# Patient Record
Sex: FEMALE | Race: ASIAN | NOT HISPANIC OR LATINO | URBAN - METROPOLITAN AREA
[De-identification: names, ages, dates, MRNs, and addresses within clinical notes are randomized per-mention and may not be internally consistent; named-entity substitution may affect disease eponyms.]

---

## 2017-06-06 ENCOUNTER — EMERGENCY (EMERGENCY)
Facility: HOSPITAL | Age: 32
LOS: 1 days | Discharge: HOME | End: 2017-06-06
Attending: EMERGENCY MEDICINE | Admitting: EMERGENCY MEDICINE
Payer: MEDICAID

## 2017-06-06 VITALS
HEART RATE: 74 BPM | RESPIRATION RATE: 18 BRPM | SYSTOLIC BLOOD PRESSURE: 141 MMHG | OXYGEN SATURATION: 99 % | TEMPERATURE: 98 F | DIASTOLIC BLOOD PRESSURE: 68 MMHG

## 2017-06-06 VITALS
OXYGEN SATURATION: 100 % | TEMPERATURE: 98 F | SYSTOLIC BLOOD PRESSURE: 125 MMHG | HEART RATE: 81 BPM | RESPIRATION RATE: 18 BRPM | DIASTOLIC BLOOD PRESSURE: 82 MMHG

## 2017-06-06 LAB
ALBUMIN SERPL ELPH-MCNC: 3.8 G/DL — SIGNIFICANT CHANGE UP (ref 3.3–5)
ALP SERPL-CCNC: 65 U/L — SIGNIFICANT CHANGE UP (ref 40–120)
ALT FLD-CCNC: 15 U/L — SIGNIFICANT CHANGE UP (ref 4–33)
APPEARANCE UR: CLEAR — SIGNIFICANT CHANGE UP
AST SERPL-CCNC: 22 U/L — SIGNIFICANT CHANGE UP (ref 4–32)
BACTERIA # UR AUTO: SIGNIFICANT CHANGE UP
BASOPHILS # BLD AUTO: 0.03 K/UL — SIGNIFICANT CHANGE UP (ref 0–0.2)
BASOPHILS NFR BLD AUTO: 0.3 % — SIGNIFICANT CHANGE UP (ref 0–2)
BILIRUB SERPL-MCNC: 0.2 MG/DL — SIGNIFICANT CHANGE UP (ref 0.2–1.2)
BILIRUB UR-MCNC: NEGATIVE — SIGNIFICANT CHANGE UP
BLOOD UR QL VISUAL: NEGATIVE — SIGNIFICANT CHANGE UP
BUN SERPL-MCNC: 8 MG/DL — SIGNIFICANT CHANGE UP (ref 7–23)
CALCIUM SERPL-MCNC: 8.8 MG/DL — SIGNIFICANT CHANGE UP (ref 8.4–10.5)
CHLORIDE SERPL-SCNC: 105 MMOL/L — SIGNIFICANT CHANGE UP (ref 98–107)
CO2 SERPL-SCNC: 16 MMOL/L — LOW (ref 22–31)
COLOR SPEC: SIGNIFICANT CHANGE UP
CREAT SERPL-MCNC: 0.59 MG/DL — SIGNIFICANT CHANGE UP (ref 0.5–1.3)
EOSINOPHIL # BLD AUTO: 0.26 K/UL — SIGNIFICANT CHANGE UP (ref 0–0.5)
EOSINOPHIL NFR BLD AUTO: 2.2 % — SIGNIFICANT CHANGE UP (ref 0–6)
GLUCOSE SERPL-MCNC: 81 MG/DL — SIGNIFICANT CHANGE UP (ref 70–99)
GLUCOSE UR-MCNC: NEGATIVE — SIGNIFICANT CHANGE UP
HCT VFR BLD CALC: 39.1 % — SIGNIFICANT CHANGE UP (ref 34.5–45)
HGB BLD-MCNC: 11.9 G/DL — SIGNIFICANT CHANGE UP (ref 11.5–15.5)
IMM GRANULOCYTES NFR BLD AUTO: 0.5 % — SIGNIFICANT CHANGE UP (ref 0–1.5)
KETONES UR-MCNC: NEGATIVE — SIGNIFICANT CHANGE UP
LEUKOCYTE ESTERASE UR-ACNC: HIGH
LYMPHOCYTES # BLD AUTO: 18.1 % — SIGNIFICANT CHANGE UP (ref 13–44)
LYMPHOCYTES # BLD AUTO: 2.13 K/UL — SIGNIFICANT CHANGE UP (ref 1–3.3)
MCHC RBC-ENTMCNC: 25.7 PG — LOW (ref 27–34)
MCHC RBC-ENTMCNC: 30.4 % — LOW (ref 32–36)
MCV RBC AUTO: 84.4 FL — SIGNIFICANT CHANGE UP (ref 80–100)
MONOCYTES # BLD AUTO: 0.61 K/UL — SIGNIFICANT CHANGE UP (ref 0–0.9)
MONOCYTES NFR BLD AUTO: 5.2 % — SIGNIFICANT CHANGE UP (ref 2–14)
NEUTROPHILS # BLD AUTO: 8.66 K/UL — HIGH (ref 1.8–7.4)
NEUTROPHILS NFR BLD AUTO: 73.7 % — SIGNIFICANT CHANGE UP (ref 43–77)
NITRITE UR-MCNC: NEGATIVE — SIGNIFICANT CHANGE UP
NON-SQ EPI CELLS # UR AUTO: <1 — SIGNIFICANT CHANGE UP
PH UR: 6.5 — SIGNIFICANT CHANGE UP (ref 4.6–8)
PLATELET # BLD AUTO: 124 K/UL — LOW (ref 150–400)
PMV BLD: SIGNIFICANT CHANGE UP FL (ref 7–13)
POTASSIUM SERPL-MCNC: 5.1 MMOL/L — SIGNIFICANT CHANGE UP (ref 3.5–5.3)
POTASSIUM SERPL-SCNC: 5.1 MMOL/L — SIGNIFICANT CHANGE UP (ref 3.5–5.3)
PROT SERPL-MCNC: 6.9 G/DL — SIGNIFICANT CHANGE UP (ref 6–8.3)
PROT UR-MCNC: NEGATIVE — SIGNIFICANT CHANGE UP
RBC # BLD: 4.63 M/UL — SIGNIFICANT CHANGE UP (ref 3.8–5.2)
RBC # FLD: 14 % — SIGNIFICANT CHANGE UP (ref 10.3–14.5)
RBC CASTS # UR COMP ASSIST: SIGNIFICANT CHANGE UP (ref 0–?)
SODIUM SERPL-SCNC: 139 MMOL/L — SIGNIFICANT CHANGE UP (ref 135–145)
SP GR SPEC: 1 — SIGNIFICANT CHANGE UP (ref 1–1.03)
SQUAMOUS # UR AUTO: SIGNIFICANT CHANGE UP
UROBILINOGEN FLD QL: NORMAL E.U. — SIGNIFICANT CHANGE UP (ref 0.1–0.2)
WBC # BLD: 11.75 K/UL — HIGH (ref 3.8–10.5)
WBC # FLD AUTO: 11.75 K/UL — HIGH (ref 3.8–10.5)
WBC UR QL: HIGH (ref 0–?)

## 2017-06-06 PROCEDURE — 99284 EMERGENCY DEPT VISIT MOD MDM: CPT

## 2017-06-06 RX ORDER — CEPHALEXIN 500 MG
1 CAPSULE ORAL
Qty: 28 | Refills: 0 | OUTPATIENT
Start: 2017-06-06 | End: 2017-06-13

## 2017-06-06 RX ORDER — IBUPROFEN 200 MG
1 TABLET ORAL
Qty: 30 | Refills: 0
Start: 2017-06-06

## 2017-06-06 RX ORDER — ONDANSETRON 8 MG/1
1 TABLET, FILM COATED ORAL
Qty: 15 | Refills: 0 | OUTPATIENT
Start: 2017-06-06

## 2017-06-06 RX ORDER — KETOROLAC TROMETHAMINE 30 MG/ML
30 SYRINGE (ML) INJECTION ONCE
Qty: 0 | Refills: 0 | Status: DISCONTINUED | OUTPATIENT
Start: 2017-06-06 | End: 2017-06-06

## 2017-06-06 RX ADMIN — Medication 30 MILLIGRAM(S): at 14:09

## 2017-06-06 NOTE — ED PROVIDER NOTE - MEDICAL DECISION MAKING DETAILS
32f, with renal colic. given same as previous and did not need procedure, no need for imaging today. will do labs, ua, toradol, r/a.

## 2017-06-06 NOTE — ED PROVIDER NOTE - PROGRESS NOTE DETAILS
symptoms improved. labs show wbc 5-10 in ua with LE, will give keflex. bloods otherwise unremarkable. will give zofran, ibuprofen and keflex for d/c. rter for persistent pain and vomiting or any s/s UTI. f/u urology.

## 2017-06-06 NOTE — ED ADULT NURSE NOTE - OBJECTIVE STATEMENT
pt AO x3, ambulatory, c/o left lower back pain radiating to left groin area since this morning. Also c/o n/v. Denies chest pain, dizziness and SOB by now. Evaluated by provider. Blood obtained and sent. IV inserted. Right hand 22G. Due medication given as ordered. Will continue to monitor.

## 2017-06-06 NOTE — ED PROVIDER NOTE - OBJECTIVE STATEMENT
32f, pmhx kidney stones 6 months ago, passed spontaneously. this am, began to have left flank pain raditating to llq, a/w n/v, same as previous kidney stone. no f/c or dysuria, no change in bm. did not try meds prior to coming to ED. 32f, pmhx kidney stones 6 months ago, passed spontaneously. this am, began to have left flank pain radiating to llq, a/w n/v, same as previous kidney stone. no f/c or dysuria, no change in bm. did not try meds prior to coming to ED.

## 2019-03-01 ENCOUNTER — EMERGENCY (EMERGENCY)
Facility: HOSPITAL | Age: 34
LOS: 1 days | Discharge: HOME | End: 2019-03-01
Attending: EMERGENCY MEDICINE | Admitting: EMERGENCY MEDICINE
Payer: COMMERCIAL

## 2019-03-01 VITALS
SYSTOLIC BLOOD PRESSURE: 128 MMHG | HEART RATE: 79 BPM | TEMPERATURE: 98 F | RESPIRATION RATE: 16 BRPM | DIASTOLIC BLOOD PRESSURE: 89 MMHG | OXYGEN SATURATION: 100 %

## 2019-03-01 LAB
APPEARANCE UR: CLEAR — SIGNIFICANT CHANGE UP
BACTERIA # UR AUTO: SIGNIFICANT CHANGE UP
BASE EXCESS BLDV CALC-SCNC: 2.7 MMOL/L — SIGNIFICANT CHANGE UP
BASOPHILS # BLD AUTO: 0.04 K/UL — SIGNIFICANT CHANGE UP (ref 0–0.2)
BASOPHILS NFR BLD AUTO: 0.3 % — SIGNIFICANT CHANGE UP (ref 0–2)
BILIRUB UR-MCNC: NEGATIVE — SIGNIFICANT CHANGE UP
BLOOD GAS VENOUS - CREATININE: 0.65 MG/DL — SIGNIFICANT CHANGE UP (ref 0.5–1.3)
BLOOD UR QL VISUAL: SIGNIFICANT CHANGE UP
CHLORIDE BLDV-SCNC: 105 MMOL/L — SIGNIFICANT CHANGE UP (ref 96–108)
COLOR SPEC: SIGNIFICANT CHANGE UP
EOSINOPHIL # BLD AUTO: 0.15 K/UL — SIGNIFICANT CHANGE UP (ref 0–0.5)
EOSINOPHIL NFR BLD AUTO: 1.3 % — SIGNIFICANT CHANGE UP (ref 0–6)
GAS PNL BLDV: 135 MMOL/L — LOW (ref 136–146)
GLUCOSE BLDV-MCNC: 110 — HIGH (ref 70–99)
GLUCOSE UR-MCNC: NEGATIVE — SIGNIFICANT CHANGE UP
HCO3 BLDV-SCNC: 25 MMOL/L — SIGNIFICANT CHANGE UP (ref 20–27)
HCT VFR BLD CALC: 40.6 % — SIGNIFICANT CHANGE UP (ref 34.5–45)
HCT VFR BLDV CALC: 39.1 % — SIGNIFICANT CHANGE UP (ref 34.5–45)
HGB BLD-MCNC: 12.5 G/DL — SIGNIFICANT CHANGE UP (ref 11.5–15.5)
HGB BLDV-MCNC: 12.7 G/DL — SIGNIFICANT CHANGE UP (ref 11.5–15.5)
HYALINE CASTS # UR AUTO: NEGATIVE — SIGNIFICANT CHANGE UP
IMM GRANULOCYTES NFR BLD AUTO: 0.4 % — SIGNIFICANT CHANGE UP (ref 0–1.5)
KETONES UR-MCNC: NEGATIVE — SIGNIFICANT CHANGE UP
LACTATE BLDV-MCNC: 1.5 MMOL/L — SIGNIFICANT CHANGE UP (ref 0.5–2)
LEUKOCYTE ESTERASE UR-ACNC: SIGNIFICANT CHANGE UP
LYMPHOCYTES # BLD AUTO: 18.1 % — SIGNIFICANT CHANGE UP (ref 13–44)
LYMPHOCYTES # BLD AUTO: 2.1 K/UL — SIGNIFICANT CHANGE UP (ref 1–3.3)
MCHC RBC-ENTMCNC: 25.5 PG — LOW (ref 27–34)
MCHC RBC-ENTMCNC: 30.8 % — LOW (ref 32–36)
MCV RBC AUTO: 82.9 FL — SIGNIFICANT CHANGE UP (ref 80–100)
MONOCYTES # BLD AUTO: 0.86 K/UL — SIGNIFICANT CHANGE UP (ref 0–0.9)
MONOCYTES NFR BLD AUTO: 7.4 % — SIGNIFICANT CHANGE UP (ref 2–14)
NEUTROPHILS # BLD AUTO: 8.38 K/UL — HIGH (ref 1.8–7.4)
NEUTROPHILS NFR BLD AUTO: 72.5 % — SIGNIFICANT CHANGE UP (ref 43–77)
NITRITE UR-MCNC: NEGATIVE — SIGNIFICANT CHANGE UP
NRBC # FLD: 0 K/UL — LOW (ref 25–125)
PCO2 BLDV: 48 MMHG — SIGNIFICANT CHANGE UP (ref 41–51)
PH BLDV: 7.38 PH — SIGNIFICANT CHANGE UP (ref 7.32–7.43)
PH UR: 6.5 — SIGNIFICANT CHANGE UP (ref 5–8)
PLATELET # BLD AUTO: 167 K/UL — SIGNIFICANT CHANGE UP (ref 150–400)
PMV BLD: SIGNIFICANT CHANGE UP FL (ref 7–13)
PO2 BLDV: 29 MMHG — LOW (ref 35–40)
POTASSIUM BLDV-SCNC: 3.3 MMOL/L — LOW (ref 3.4–4.5)
PROT UR-MCNC: NEGATIVE — SIGNIFICANT CHANGE UP
RBC # BLD: 4.9 M/UL — SIGNIFICANT CHANGE UP (ref 3.8–5.2)
RBC # FLD: 13.3 % — SIGNIFICANT CHANGE UP (ref 10.3–14.5)
RBC CASTS # UR COMP ASSIST: SIGNIFICANT CHANGE UP (ref 0–?)
SAO2 % BLDV: 46.5 % — LOW (ref 60–85)
SP GR SPEC: 1.01 — SIGNIFICANT CHANGE UP (ref 1–1.04)
SQUAMOUS # UR AUTO: SIGNIFICANT CHANGE UP
UROBILINOGEN FLD QL: NORMAL — SIGNIFICANT CHANGE UP
WBC # BLD: 11.58 K/UL — HIGH (ref 3.8–10.5)
WBC # FLD AUTO: 11.58 K/UL — HIGH (ref 3.8–10.5)
WBC UR QL: HIGH (ref 0–?)

## 2019-03-01 PROCEDURE — 99285 EMERGENCY DEPT VISIT HI MDM: CPT

## 2019-03-01 RX ORDER — ONDANSETRON 8 MG/1
4 TABLET, FILM COATED ORAL ONCE
Qty: 0 | Refills: 0 | Status: COMPLETED | OUTPATIENT
Start: 2019-03-01 | End: 2019-03-01

## 2019-03-01 RX ORDER — SODIUM CHLORIDE 9 MG/ML
1000 INJECTION INTRAMUSCULAR; INTRAVENOUS; SUBCUTANEOUS ONCE
Qty: 0 | Refills: 0 | Status: COMPLETED | OUTPATIENT
Start: 2019-03-01 | End: 2019-03-01

## 2019-03-01 RX ORDER — KETOROLAC TROMETHAMINE 30 MG/ML
15 SYRINGE (ML) INJECTION ONCE
Qty: 0 | Refills: 0 | Status: DISCONTINUED | OUTPATIENT
Start: 2019-03-01 | End: 2019-03-01

## 2019-03-01 RX ORDER — MORPHINE SULFATE 50 MG/1
2 CAPSULE, EXTENDED RELEASE ORAL ONCE
Qty: 0 | Refills: 0 | Status: DISCONTINUED | OUTPATIENT
Start: 2019-03-01 | End: 2019-03-01

## 2019-03-01 RX ADMIN — ONDANSETRON 4 MILLIGRAM(S): 8 TABLET, FILM COATED ORAL at 23:53

## 2019-03-01 RX ADMIN — SODIUM CHLORIDE 2000 MILLILITER(S): 9 INJECTION INTRAMUSCULAR; INTRAVENOUS; SUBCUTANEOUS at 23:32

## 2019-03-01 RX ADMIN — MORPHINE SULFATE 2 MILLIGRAM(S): 50 CAPSULE, EXTENDED RELEASE ORAL at 23:53

## 2019-03-01 NOTE — ED PROVIDER NOTE - NSFOLLOWUPINSTRUCTIONS_ED_ALL_ED_FT
You have a kidney stone. Take the medication we prescribed, FLOMAX. This may help the stone pass. Take IBUPROFEN 600 mg ever 6 hours for pain. You can also take ZOFRAN 4 mg every 8 hours as needed for nasuea. Please follow up with urology office, the number is [phone] and the name is Dr. Burr. You can strain your urine every time you pee to see what the stone is made of.    Return to the ER for fever, severe pain or other new/concerning symptoms.

## 2019-03-01 NOTE — ED PROVIDER NOTE - CLINICAL SUMMARY MEDICAL DECISION MAKING FREE TEXT BOX
Efrem PGY1- mdm 34 yo F, pmhx kidney stones, flank pain on R since noon, feels like prior stone, no fever, + N/V, no vag bleeding/discharge, denies being pregant, felt well this am, no gross hematuia  mild ttp to RLQ./suprapubic region, no CVA T, lungs CTA, heart RRR, normal neuro exam, extrem wwp  labs, urine, preg, us renal, pain meds, ivf  ddx: kidney stone, uti, eval for preg ovarian path if no relief with pain meds

## 2019-03-01 NOTE — ED PROVIDER NOTE - PROGRESS NOTE DETAILS
Haverty PGY1- ct shows small stone R uvj, 2 mm, mild hydro, small stone L inferior renal pole, urology consulted will see in ED Haverty PGY1- pain better, can be d/c-ed and f/u with uro, contact number given, pain meds and zofran written for, return precautions given Haverty PGY1- pain better, can be d/c-ed and f/u with uro, no abx needed, contact number given, pain meds and zofran written for, return precautions given

## 2019-03-01 NOTE — ED ADULT TRIAGE NOTE - CHIEF COMPLAINT QUOTE
Pt appears very uncomfortable, doubled over in pain....st" I have hx of kidney stones and today I started to get severe pains in abd traveling around to rt side and back....feels similar." + vomiting.

## 2019-03-01 NOTE — ED PROVIDER NOTE - PHYSICAL EXAMINATION
PHYSICAL EXAM:  GENERAL: moderate distress, unable to fit still, conversant, cooperative   HEAD:  Atraumatic, Normocephalic  EYES: EOMI, PERRLA, conjunctiva and sclera clear  ENMT: No tonsillar erythema, exudates, or enlargement; Moist mucous membranes  NECK: Supple, No JVD  HEART: Regular rate and rhythm; No murmurs, rubs, or gallops  RESPIRATORY: CTA B/L, No W/R/R  ABDOMEN: soft, mild RLQ/suprapubic tenderness, no guarding/rebound  BACK: no CVAT, normal ROM, no ecchymosis  NEURO A&Ox3, nonfocal, moving all extremities  EXTREMITIES:  2+ Peripheral Pulses, No clubbing, cyanosis, or edema  SKIN: warm, dry, normal color, no rash or abnormal lesions

## 2019-03-01 NOTE — ED PROVIDER NOTE - ATTENDING CONTRIBUTION TO CARE
Dr. Conroy: I have personally seen and examined this patient at the bedside. I have fully participated in the care of this patient. I have reviewed all pertinent clinical information, including history, physical exam, plan and the Resident's note and agree except as noted. HPI above as by me. PE above as by me. DDX pyelo versus infected stone PLAN cbc, cmp, ua, ucx, ct DISPO if pyelo ok for home, if infected stone uro c/s.

## 2019-03-01 NOTE — ED PROVIDER NOTE - OBJECTIVE STATEMENT
33 yoF with pmhx of kidney stone 2 years ago, ruptured ectopic on R sided with salpingectomy/oophorectomy presents to ED with complaint of R flank pain radiating to R groin. States started gradually around noon and became worse this evening. Nauseated with NBNB vomiting. No fever. Mild dysuria. No gross hematuria. Took Ibuprofen which helped significantly but only for 2 hours. Felt well this am. No chest pain, cough, or SOB. Denies being pregnant, on oral contraceptives. No vaginal bleeding/discharge. 33 yoF with pmhx of kidney stone 2 years ago, ruptured ectopic on R sided with salpingectomy/oophorectomy presents to ED with complaint of R flank pain radiating to R groin. States started gradually around noon and became worse this evening. Nauseated with NBNB vomiting. No fever. Mild dysuria. No gross hematuria. Took Ibuprofen which helped significantly but only for 2 hours. Felt well this am. No chest pain, cough, or SOB. Denies being pregnant, on oral contraceptives. No vaginal bleeding/discharge.  01:00 Conroy att: 33F h/o urolithiasis no intervention c/o 12 hours intermitt rt flank pain. Today noon patient experienced sudden onset rt low flank pain. Subsided. 2-3PM same rt flank pain. Took ibuprofen with relief. 6PM Same rt flank pain. Pos nbnb emesis x 4. Denies f, c, n, anuria, dysuria, hematuria. Feels better after toradol in ED.

## 2019-03-02 VITALS
SYSTOLIC BLOOD PRESSURE: 114 MMHG | OXYGEN SATURATION: 100 % | HEART RATE: 64 BPM | TEMPERATURE: 98 F | RESPIRATION RATE: 18 BRPM | DIASTOLIC BLOOD PRESSURE: 70 MMHG

## 2019-03-02 DIAGNOSIS — N20.1 CALCULUS OF URETER: ICD-10-CM

## 2019-03-02 PROBLEM — N20.0 CALCULUS OF KIDNEY: Chronic | Status: ACTIVE | Noted: 2017-06-06

## 2019-03-02 LAB
ALBUMIN SERPL ELPH-MCNC: 4.1 G/DL — SIGNIFICANT CHANGE UP (ref 3.3–5)
ALP SERPL-CCNC: 73 U/L — SIGNIFICANT CHANGE UP (ref 40–120)
ALT FLD-CCNC: 10 U/L — SIGNIFICANT CHANGE UP (ref 4–33)
ANION GAP SERPL CALC-SCNC: 11 MMO/L — SIGNIFICANT CHANGE UP (ref 7–14)
AST SERPL-CCNC: 17 U/L — SIGNIFICANT CHANGE UP (ref 4–32)
BILIRUB SERPL-MCNC: < 0.2 MG/DL — LOW (ref 0.2–1.2)
BUN SERPL-MCNC: 10 MG/DL — SIGNIFICANT CHANGE UP (ref 7–23)
CALCIUM SERPL-MCNC: 9.1 MG/DL — SIGNIFICANT CHANGE UP (ref 8.4–10.5)
CHLORIDE SERPL-SCNC: 100 MMOL/L — SIGNIFICANT CHANGE UP (ref 98–107)
CO2 SERPL-SCNC: 25 MMOL/L — SIGNIFICANT CHANGE UP (ref 22–31)
CREAT SERPL-MCNC: 0.73 MG/DL — SIGNIFICANT CHANGE UP (ref 0.5–1.3)
GLUCOSE SERPL-MCNC: 111 MG/DL — HIGH (ref 70–99)
POTASSIUM SERPL-MCNC: 3.4 MMOL/L — LOW (ref 3.5–5.3)
POTASSIUM SERPL-SCNC: 3.4 MMOL/L — LOW (ref 3.5–5.3)
PROT SERPL-MCNC: 6.9 G/DL — SIGNIFICANT CHANGE UP (ref 6–8.3)
SODIUM SERPL-SCNC: 136 MMOL/L — SIGNIFICANT CHANGE UP (ref 135–145)

## 2019-03-02 PROCEDURE — 74176 CT ABD & PELVIS W/O CONTRAST: CPT | Mod: 26

## 2019-03-02 RX ORDER — PHENAZOPYRIDINE HCL 100 MG
1 TABLET ORAL
Qty: 5 | Refills: 0 | OUTPATIENT
Start: 2019-03-02 | End: 2019-03-03

## 2019-03-02 RX ORDER — SODIUM CHLORIDE 9 MG/ML
1000 INJECTION INTRAMUSCULAR; INTRAVENOUS; SUBCUTANEOUS ONCE
Qty: 0 | Refills: 0 | Status: COMPLETED | OUTPATIENT
Start: 2019-03-02 | End: 2019-03-02

## 2019-03-02 RX ORDER — KETOROLAC TROMETHAMINE 30 MG/ML
15 SYRINGE (ML) INJECTION ONCE
Qty: 0 | Refills: 0 | Status: DISCONTINUED | OUTPATIENT
Start: 2019-03-02 | End: 2019-03-02

## 2019-03-02 RX ORDER — ONDANSETRON 8 MG/1
1 TABLET, FILM COATED ORAL
Qty: 10 | Refills: 0 | OUTPATIENT
Start: 2019-03-02

## 2019-03-02 RX ORDER — PHENAZOPYRIDINE HCL 100 MG
200 TABLET ORAL ONCE
Qty: 0 | Refills: 0 | Status: COMPLETED | OUTPATIENT
Start: 2019-03-02 | End: 2019-03-02

## 2019-03-02 RX ORDER — TAMSULOSIN HYDROCHLORIDE 0.4 MG/1
1 CAPSULE ORAL
Qty: 14 | Refills: 0 | OUTPATIENT
Start: 2019-03-02 | End: 2019-03-15

## 2019-03-02 RX ORDER — CEFTRIAXONE 500 MG/1
1 INJECTION, POWDER, FOR SOLUTION INTRAMUSCULAR; INTRAVENOUS ONCE
Qty: 0 | Refills: 0 | Status: COMPLETED | OUTPATIENT
Start: 2019-03-02 | End: 2019-03-02

## 2019-03-02 RX ADMIN — Medication 200 MILLIGRAM(S): at 02:14

## 2019-03-02 RX ADMIN — MORPHINE SULFATE 2 MILLIGRAM(S): 50 CAPSULE, EXTENDED RELEASE ORAL at 00:17

## 2019-03-02 RX ADMIN — SODIUM CHLORIDE 1000 MILLILITER(S): 9 INJECTION INTRAMUSCULAR; INTRAVENOUS; SUBCUTANEOUS at 00:17

## 2019-03-02 RX ADMIN — SODIUM CHLORIDE 2000 MILLILITER(S): 9 INJECTION INTRAMUSCULAR; INTRAVENOUS; SUBCUTANEOUS at 00:41

## 2019-03-02 RX ADMIN — CEFTRIAXONE 100 GRAM(S): 500 INJECTION, POWDER, FOR SOLUTION INTRAMUSCULAR; INTRAVENOUS at 00:41

## 2019-03-02 RX ADMIN — Medication 15 MILLIGRAM(S): at 00:22

## 2019-03-02 NOTE — CONSULT NOTE ADULT - PROBLEM SELECTOR RECOMMENDATION 9
No urgent Urologic intervention required at this time.  Patient may be discharged with pain control and antiemetics prn  Please give script for Flomax.  Please have patient strain urine.  Patient can call for an appointment to be seen at the [facility], [phone]

## 2019-03-02 NOTE — ED ADULT NURSE NOTE - OBJECTIVE STATEMENT
pt received alert and oriented x3. pmhx kidney stones, ectopic pregnancy. pt c.o having right sided flank pain radiating to right groin. pt appears uncomfortable and gaurding right lower abd/groin,abd soft but tender in right lower quad. neg cva tenderness.  pt c.o having n/v. no active vomiting at the moment. respirations equal and unlabored. 20 g placed in left a/c. labs drawn and sent. Call bell in reach, warm blanket provided, bed in lowest position, side rails up x2,safety maintained. will continue to monitor. md at bedside for eval.

## 2019-03-02 NOTE — CONSULT NOTE ADULT - ASSESSMENT
33 year old female with a medical history significant for ectopic pregnancy s/p salpingectomy/oophorectomy and prior history of nephrolithiasis, presenting with a one day history of right flank pain with nausea and vomiting, 2mm UVJ stone, afebrile.

## 2019-03-02 NOTE — ED ADULT NURSE NOTE - NSIMPLEMENTINTERV_GEN_ALL_ED
Implemented All Universal Safety Interventions:  [place] to call system. Call bell, personal items and telephone within reach. Instruct patient to call for assistance. Room bathroom lighting operational. Non-slip footwear when patient is off stretcher. Physically safe environment: no spills, clutter or unnecessary equipment. Stretcher in lowest position, wheels locked, appropriate side rails in place.

## 2019-03-02 NOTE — CONSULT NOTE ADULT - SUBJECTIVE AND OBJECTIVE BOX
HPI: This is a 33 year old female with a medical history significant for ectopic pregnancy s/p salpingectomy/oophorectomy and prior history of nephrolithiasis, presents to the ED with a one day history of right sided flank pain radiating to the groin.  She reports she took 600mg Ibuprofen with relief for a couple of hours.  She developed nausea yesterday as well with 4 episodes of emesis, but was able to eat rice for dinner and has not had further nausea or vomiting.  She denies fevers or chills.  She denies urinary symptoms including dysuria, hematuria, increase in urgency or frequency. She reports a prior stone 2 years ago that passed without surgical intervention.     PAST MEDICAL & SURGICAL HISTORY:  Kidney stone  Ectopic pregnancy  No significant past surgical history    MEDICATIONS:  Denies      FAMILY HISTORY:  No pertinent family history in first degree relatives    Allergies  No Known Allergies    SOCIAL HISTORY:  , lives with   No toxic habits    REVIEW OF SYSTEMS: Otherwise negative as stated in HPI    Vital Signs Last 24 Hrs  T(C): 36.7 (02 Mar 2019 03:39), Max: 36.7 (01 Mar 2019 23:43)  T(F): 98.1 (02 Mar 2019 03:39), Max: 98.1 (02 Mar 2019 03:39)  HR: 64 (02 Mar 2019 03:39) (64 - 79)  BP: 114/70 (02 Mar 2019 03:39) (114/70 - 128/89)  BP(mean): --  RR: 18 (02 Mar 2019 03:39) (16 - 18)  SpO2: 100% (02 Mar 2019 03:39) (100% - 100%)    PHYSICAL EXAM:    General: well appearing, A+Ox3, no acute distress    Respiratory and Thorax: clear  	  Cardiovascular:S1S2, RRR    GI/: soft, non tender, no CVAT                        	  Extremities: no edema, no tenderness  	  LABS:                        12.5   11.58 )-----------( 167      ( 01 Mar 2019 23:06 )             40.6     03    136  |  100  |  10  ----------------------------<  111<H>  3.4<L>   |  25  |  0.73    Ca    9.1      01 Mar 2019 23:06    TPro  6.9  /  Alb  4.1  /  TBili  < 0.2<L>  /  DBili  x   /  AST  17  /  ALT  10  /  AlkPhos  73  03-      Urinalysis Basic - ( 01 Mar 2019 23:17 )    Color: LIGHT YELLOW / Appearance: CLEAR / S.012 / pH: 6.5  Gluc: NEGATIVE / Ketone: NEGATIVE  / Bili: NEGATIVE / Urobili: NORMAL   Blood: SMALL / Protein: NEGATIVE / Nitrite: NEGATIVE   Leuk Esterase: LARGE / RBC: 3-5 / WBC 26-50   Sq Epi: OCC / Non Sq Epi: x / Bacteria: 1+    RADIOLOGY:    < from: CT Abdomen and Pelvis No Cont (19 @ 01:57) >  IMPRESSION:     Mild right-sided hydronephrosis with an obstructive 2 mm calculus at the   ureterovesicular junction. No other right-sided calculi. Nonobstructive 2   mm calculus at the lower pole of the left kidney.    < end of copied text >

## 2019-03-03 LAB
BACTERIA UR CULT: SIGNIFICANT CHANGE UP
SPECIMEN SOURCE: SIGNIFICANT CHANGE UP

## 2019-03-05 PROBLEM — Z00.00 ENCOUNTER FOR PREVENTIVE HEALTH EXAMINATION: Status: ACTIVE | Noted: 2019-03-05

## 2019-03-05 NOTE — ED POST DISCHARGE NOTE - RESULT SUMMARY
UCX: streptococcus agalactiae 10,000-49,000CFU/ml. No antibiotic listed in ED provider note or prescription writer at time of discharge. Patient with a hx of kidney stones. MSG's left with call back PA # and hrs.

## 2019-03-26 ENCOUNTER — APPOINTMENT (OUTPATIENT)
Dept: UROLOGY | Facility: CLINIC | Age: 34
End: 2019-03-26
Payer: COMMERCIAL

## 2019-03-26 VITALS
RESPIRATION RATE: 17 BRPM | HEART RATE: 80 BPM | HEIGHT: 63 IN | TEMPERATURE: 98.8 F | WEIGHT: 154 LBS | DIASTOLIC BLOOD PRESSURE: 82 MMHG | BODY MASS INDEX: 27.29 KG/M2 | SYSTOLIC BLOOD PRESSURE: 126 MMHG

## 2019-03-26 DIAGNOSIS — N20.0 CALCULUS OF KIDNEY: ICD-10-CM

## 2019-03-26 DIAGNOSIS — Z87.442 PERSONAL HISTORY OF URINARY CALCULI: ICD-10-CM

## 2019-03-26 PROCEDURE — 99203 OFFICE O/P NEW LOW 30 MIN: CPT

## 2019-03-26 NOTE — REVIEW OF SYSTEMS
[Feeling Tired] : feeling tired [see HPI] : see HPI [Urine Infection (bladder/kidney)] : bladder/kidney infection [History of kidney stones] : history of kidney stones [Negative] : Heme/Lymph

## 2019-03-27 PROBLEM — Z87.442 HISTORY OF KIDNEY STONES: Status: RESOLVED | Noted: 2019-03-26 | Resolved: 2019-03-27

## 2019-03-27 PROBLEM — N20.0 RECURRENT KIDNEY STONES: Status: ACTIVE | Noted: 2019-03-27

## 2019-03-27 NOTE — ASSESSMENT
[FreeTextEntry1] : I reviewed the CT scan images with the patient today showing the presence of a small right distal ureteral stone. There is no other stone burden noted on CT imaging. She has bilateral kidneys which appear otherwise normal in their size. There is mild hydronephrosis noted of the right kidney at the time of the CT scan. Given the lack of any residual symptoms along with a small stone size and location at the time of the CT, she almost certainly did pass a kidney stone and I would not recommend that she have any immediate followup imaging.\par \par Given the recurrent nature of her stones and her relatively young age I would suggest a metabolic stone workup. We will initiate this with a 24-hour urine collection and I will see her back to review the results with her in the office. We did generally discuss stone prevention strategy today including high fluid intake a low-sodium diet which will likely be helpful and depending on her urine test results, this may warrant further treatment or evaluation.\par \par Further imaging will be obtained in about a year with another sonogram of her kidneys at that time to reassess for any new stone burden.

## 2019-03-27 NOTE — HISTORY OF PRESENT ILLNESS
[FreeTextEntry1] : Mari Conroy presents to the office today. She is a 34-year-old woman who is here after recent development of flank pain earlier this month prompting a visit to the emergency department. She underwent imaging at the time showing the presence of a small distal ureteral stone on the right side measuring about 2-3 mm. Since she was in the ER, she has not had any persistent pain. She did not see the stone past but has felt much improved since her visit. She reports that she has history of kidney stones, at least 2 or 3 times over the last few years. Prior that she does not recall any history of kidney stones. She has never been able to collect the stone for analysis but has never required a procedure to remove a stone passing.\par \par She denies any current symptoms of flank pain or right pain. She has no gross hematuria or dysuria. She denies any associated fevers or chills at that time and this has recurred. She denies nausea or vomiting. Her appetite is stable. She denies any unintentional weight loss.

## 2019-07-29 ENCOUNTER — EMERGENCY (EMERGENCY)
Facility: HOSPITAL | Age: 34
LOS: 1 days | Discharge: HOME | End: 2019-07-29
Attending: EMERGENCY MEDICINE | Admitting: EMERGENCY MEDICINE
Payer: OTHER MISCELLANEOUS

## 2019-07-29 VITALS
RESPIRATION RATE: 18 BRPM | DIASTOLIC BLOOD PRESSURE: 79 MMHG | HEART RATE: 91 BPM | SYSTOLIC BLOOD PRESSURE: 115 MMHG | OXYGEN SATURATION: 100 % | TEMPERATURE: 99 F

## 2019-07-29 PROCEDURE — 12011 RPR F/E/E/N/L/M 2.5 CM/<: CPT | Mod: RT

## 2019-07-29 PROCEDURE — 99282 EMERGENCY DEPT VISIT SF MDM: CPT | Mod: 25

## 2019-07-29 RX ORDER — IBUPROFEN 200 MG
600 TABLET ORAL ONCE
Refills: 0 | Status: COMPLETED | OUTPATIENT
Start: 2019-07-29 | End: 2019-07-29

## 2019-07-29 RX ORDER — TETANUS TOXOID, REDUCED DIPHTHERIA TOXOID AND ACELLULAR PERTUSSIS VACCINE, ADSORBED 5; 2.5; 8; 8; 2.5 [IU]/.5ML; [IU]/.5ML; UG/.5ML; UG/.5ML; UG/.5ML
0.5 SUSPENSION INTRAMUSCULAR ONCE
Refills: 0 | Status: COMPLETED | OUTPATIENT
Start: 2019-07-29 | End: 2019-07-29

## 2019-07-29 RX ADMIN — Medication 600 MILLIGRAM(S): at 15:26

## 2019-07-29 RX ADMIN — TETANUS TOXOID, REDUCED DIPHTHERIA TOXOID AND ACELLULAR PERTUSSIS VACCINE, ADSORBED 0.5 MILLILITER(S): 5; 2.5; 8; 8; 2.5 SUSPENSION INTRAMUSCULAR at 15:02

## 2019-07-29 NOTE — ED PROVIDER NOTE - NS ED ROS FT
ROS:  GENERAL: No fever, no chills  EYES: no change in vision  HEENT: no trouble swallowing, no trouble speaking  CARDIAC: no chest pain  PULMONARY: no cough, no shortness of breath  GI: no abdominal pain, no nausea, no vomiting, no diarrhea, no constipation  : No dysuria, no frequency, no change in appearance, or odor of urine  SKIN: chin laceration  NEURO: no headache, no weakness  MSK: No joint pain

## 2019-07-29 NOTE — ED ADULT TRIAGE NOTE - CHIEF COMPLAINT QUOTE
pt accidently walked into a oven door while walking. denies LOC, horseshoe shaped laceration to chin. bleeding controlled. able to speak without difficulty denies jaw pain.

## 2019-07-29 NOTE — ED PROVIDER NOTE - NSFOLLOWUPINSTRUCTIONS_ED_ALL_ED_FT
- FOR PAIN OR FEVER YOU CAN TAKE IBUPROFEN (MOTRIN, ADVIL) OR ACETAMINOPHEN (TYLENOL) AS NEEDED, AS DIRECTED ON PACKAGING.  - RETURN TO THE EMERGENCY DEPARTMENT OR FOLLOW UP WITH YOUR PRIMARY DOCTOR IN 5-7 DAYS FOR SUTURE REMOVAL (6 SIMPLE SUTURES PLACED)  - IF NEEDED, CALL PATIENT ACCESS SERVICES AT [phone] TO FIND A PRIMARY CARE PHYSICIAN.  OR CALL [phone] TO MAKE AN APPOINTMENT WITH THE CLINIC.  - RETURN TO THE ER FOR ANY WORSENING SYMPTOMS OR CONCERNS INCLUDING REDNESS, PUS OR SEVERE PAIN FROM YOUR CHIN WOUND - FOR PAIN OR FEVER YOU CAN TAKE IBUPROFEN (MOTRIN, ADVIL) OR ACETAMINOPHEN (TYLENOL) AS NEEDED, AS DIRECTED ON PACKAGING.  - GENTLY WASH AREA, AVOID CHEWING LARGE AMOUNTS OF FOOD, EAT SOFT FOOD UNTIL SUTURES ARE REMOVED, LIMIT MOTION OF MOUTH TO BEST OF YOUR ABILITY FOR OPTIMAL WOUND HEALING  - RETURN TO THE EMERGENCY DEPARTMENT OR FOLLOW UP WITH YOUR PRIMARY DOCTOR IN 5-7 DAYS FOR SUTURE REMOVAL (6 SIMPLE SUTURES PLACED)  - IF NEEDED, CALL PATIENT ACCESS SERVICES AT [phone] TO FIND A PRIMARY CARE PHYSICIAN.  OR CALL [phone] TO MAKE AN APPOINTMENT WITH THE CLINIC.  - RETURN TO THE ER FOR ANY WORSENING SYMPTOMS OR CONCERNS INCLUDING REDNESS, PUS OR SEVERE PAIN FROM YOUR CHIN WOUND

## 2019-07-29 NOTE — ED PROCEDURE NOTE - CPROC ED LOCAL ANESTHESIA1
1% lidocaine 1% lidocaine/2 cc's to wound site and injected 3 cc's for inferior alveolar nerve block

## 2019-07-29 NOTE — ED PROVIDER NOTE - OBJECTIVE STATEMENT
33yo female no PMH presenting s/p walking into a non-hot oven door at work and lacerating her right chin. Unknown when last tetanus and patient has no other complaints. Denies LOC or fall.

## 2019-07-29 NOTE — ED PROVIDER NOTE - CLINICAL SUMMARY MEDICAL DECISION MAKING FREE TEXT BOX
35yo female w/ laceration to the chin after walking into oven door no LOC or fall or burn with some difficulty opening mouth fully.    -lac repair performed  -tetanus booster given  -motrin given

## 2019-07-29 NOTE — ED PROVIDER NOTE - PROGRESS NOTE DETAILS
Brandon Chamberlain PGY-1: patient tolerated lac repair well, 6 simple 5-0 sutures placed. Expressed understanding to keep area clean and to return if area becomes painful, erythematous or expresses discharge

## 2019-07-29 NOTE — ED PROVIDER NOTE - PHYSICAL EXAMINATION
Physical Exam:  Gen: NAD, AOx3, non-toxic appearing, able to ambulate without assistance  Head: NCAT  HEENT: non-linear chin laceration, difficulty open mouth fully secondary to pain, EOMI, PEERLA, normal conjunctiva, tongue midline, oral mucosa moist  Lung: CTAB, no respiratory distress, no wheezes/rhonchi/rales B/L, speaking in full sentences  CV: RRR, no murmurs, rubs or gallops  Abd: soft, NT, ND  MSK: no visible deformities, ROM normal in UE/LE, no back pain  Neuro: No focal sensory or motor deficits  Skin: Warm, well perfused, no rash, no leg swelling  Psych: normal affect, calm

## 2019-08-04 ENCOUNTER — EMERGENCY (EMERGENCY)
Facility: HOSPITAL | Age: 34
LOS: 1 days | Discharge: HOME | End: 2019-08-04
Admitting: EMERGENCY MEDICINE

## 2019-08-04 VITALS
DIASTOLIC BLOOD PRESSURE: 74 MMHG | HEART RATE: 97 BPM | TEMPERATURE: 99 F | SYSTOLIC BLOOD PRESSURE: 112 MMHG | RESPIRATION RATE: 18 BRPM | OXYGEN SATURATION: 100 %

## 2019-08-04 NOTE — ED PROVIDER NOTE - PROGRESS NOTE DETAILS
Pt requesting work note for 5 days last week after incident. I have spoke with Dr. Cox who initially saw the patient and is ok with me writing work note for 5 days from last week off from work.

## 2019-08-04 NOTE — ED PROVIDER NOTE - CLINICAL SUMMARY MEDICAL DECISION MAKING FREE TEXT BOX
33 yo female with no pertinent PMHx here for suture removal. Pt sustained laceration 1 week ago s/p walking into a non-hot oven door at work.   plan: suture removal

## 2019-08-04 NOTE — ED PROVIDER NOTE - OBJECTIVE STATEMENT
35 yo female with no pertinent PMHx here for suture removal. Pt sustained laceration 1 week ago s/p walking into a non-hot oven door at work. Denies n/v/f/c, Cp, SOB, abdominal pain, dysuria, hematuria, melena, hematochezia, diarrhea, constipation, discharge from wound, redness/swelling around wound.

## 2020-01-03 NOTE — ED ADULT TRIAGE NOTE - ARRIVAL FROM
Surgeon: Dr Xuan Garcia.  Greer Chiu  1963  [phone]      Consult 1/3/20    #________________  Fax [phone]     Surgery ___________  [email or web address]     Procedure/Surgery: Right Breast Bracketed partcial Mastectomy o
Home

## 2020-06-14 NOTE — ED PROCEDURE NOTE - ATTENDING CONTRIBUTION TO CARE
Epidural Block    Patient location during procedure: OB  Start time: 6/14/2020 3:55 AM  End time: 6/14/2020 4:10 AM  Reason for block: labor epidural  Staffing  Anesthesiologist: Tamara Hua MD  Resident/CRNA: VIN Venegas - CRNA  Performed: resident/CRNA   Preanesthetic Checklist  Completed: patient identified, site marked, surgical consent, pre-op evaluation, timeout performed, IV checked, risks and benefits discussed, monitors and equipment checked, anesthesia consent given, oxygen available and patient being monitored  Epidural  Patient position: sitting  Prep: ChloraPrep  Patient monitoring: cardiac monitor, continuous pulse ox and frequent blood pressure checks  Approach: midline  Location: lumbar (1-5)  Injection technique: RYLIE air  Provider prep: mask and sterile gloves  Needle  Needle type: Tuohy   Needle gauge: 18 G  Needle length: 2.5 in  Needle insertion depth: 5 cm  Catheter type: end hole  Catheter size: 20 G.   Catheter at skin depth: 11 cm  Test dose: negative  Assessment  Hemodynamics: stable  Attempts: 1
lac repair

## 2022-02-02 NOTE — ED PROVIDER NOTE - ENMT NEGATIVE STATEMENT, MLM
Bre Estrella seen and examined. No interval changes. Right knee marked. Risk, benefits, and alternatives to surgery have been discussed at length and patient is ready to proceed with right knee open prepatellar bursa excision. Ears: no ear pain and no hearing problems.Nose: no nasal congestion and no nasal drainage.Mouth/Throat: no dysphagia, no hoarseness and no throat pain.Neck: no lumps, no pain, no stiffness and no swollen glands.

## 2022-02-15 NOTE — ED ADULT NURSE NOTE - TEMPLATE LIST FOR HEAD TO TOE ASSESSMENT
Abdominal Pain, N/V/D Otezla Pregnancy And Lactation Text: This medication is Pregnancy Category C and it isn't known if it is safe during pregnancy. It is unknown if it is excreted in breast milk.